# Patient Record
Sex: MALE | Race: BLACK OR AFRICAN AMERICAN | NOT HISPANIC OR LATINO | Employment: FULL TIME | URBAN - METROPOLITAN AREA
[De-identification: names, ages, dates, MRNs, and addresses within clinical notes are randomized per-mention and may not be internally consistent; named-entity substitution may affect disease eponyms.]

---

## 2017-03-29 ENCOUNTER — OFFICE VISIT (OUTPATIENT)
Dept: RETAIL CLINIC | Facility: CLINIC | Age: 37
End: 2017-03-29

## 2017-03-29 VITALS
WEIGHT: 220 LBS | TEMPERATURE: 99.3 F | BODY MASS INDEX: 29.8 KG/M2 | HEART RATE: 98 BPM | HEIGHT: 72 IN | OXYGEN SATURATION: 99 % | DIASTOLIC BLOOD PRESSURE: 90 MMHG | SYSTOLIC BLOOD PRESSURE: 132 MMHG

## 2017-03-29 DIAGNOSIS — J10.1 INFLUENZA B: Primary | ICD-10-CM

## 2017-03-29 DIAGNOSIS — R68.89 FLU-LIKE SYMPTOMS: ICD-10-CM

## 2017-03-29 LAB
EXPIRATION DATE: ABNORMAL
FLUAV AG NPH QL: NEGATIVE
FLUBV AG NPH QL: POSITIVE
INTERNAL CONTROL: ABNORMAL
Lab: ABNORMAL

## 2017-03-29 PROCEDURE — 99203 OFFICE O/P NEW LOW 30 MIN: CPT | Performed by: NURSE PRACTITIONER

## 2017-03-29 PROCEDURE — 87804 INFLUENZA ASSAY W/OPTIC: CPT | Performed by: NURSE PRACTITIONER

## 2017-03-29 RX ORDER — OSELTAMIVIR PHOSPHATE 75 MG/1
75 CAPSULE ORAL 2 TIMES DAILY
Qty: 10 CAPSULE | Refills: 0 | Status: SHIPPED | OUTPATIENT
Start: 2017-03-29 | End: 2017-04-03

## 2018-10-27 ENCOUNTER — OFFICE VISIT (OUTPATIENT)
Dept: RETAIL CLINIC | Facility: CLINIC | Age: 38
End: 2018-10-27

## 2018-10-27 VITALS
HEIGHT: 73 IN | DIASTOLIC BLOOD PRESSURE: 88 MMHG | SYSTOLIC BLOOD PRESSURE: 140 MMHG | WEIGHT: 212.8 LBS | OXYGEN SATURATION: 97 % | HEART RATE: 109 BPM | BODY MASS INDEX: 28.2 KG/M2 | TEMPERATURE: 98 F | RESPIRATION RATE: 18 BRPM

## 2018-10-27 DIAGNOSIS — Z00.00 WELL ADULT HEALTH CHECK: Primary | ICD-10-CM

## 2018-10-27 PROCEDURE — ADULTPHYSP: Performed by: NURSE PRACTITIONER

## 2018-10-27 NOTE — PROGRESS NOTES
"ITZ@  Berto Kumar is a 37 y.o. male.   Chief Complaint   Patient presents with   • URI     missed work earlier this week due to URI symptoms      Mr Kumar was ill with URI symptoms earlier this week.  Work is requiring him to be examined so he can return to work today.  Symptoms have resolved.        URI    This is a new problem. The current episode started in the past 7 days. The problem has been resolved. There has been no fever. Pertinent negatives include no abdominal pain, chest pain, congestion, coughing, diarrhea, dysuria, ear pain, headaches, nausea, plugged ear sensation, rash, rhinorrhea, sinus pain, sneezing, sore throat, swollen glands, vomiting or wheezing. He has tried nothing for the symptoms.        The following portions of the patient's history were reviewed and updated as appropriate: allergies, current medications, past family history, past medical history, past social history, past surgical history and problem list.  No current outpatient prescriptions on file.    No Known Allergies    Review of Systems   Constitutional: Negative for chills, fatigue and fever.   HENT: Negative for congestion, ear pain, rhinorrhea, sinus pain, sneezing, sore throat and tinnitus.    Eyes: Negative for redness and itching.   Respiratory: Negative for cough, shortness of breath and wheezing.    Cardiovascular: Negative for chest pain and palpitations.   Gastrointestinal: Negative for abdominal pain, diarrhea, nausea and vomiting.   Genitourinary: Negative for dysuria.   Skin: Negative for rash.   Neurological: Negative for dizziness and headaches.       Objective     Visit Vitals  /88 (BP Location: Right arm, Patient Position: Sitting, Cuff Size: Adult)   Pulse 109   Temp 98 °F (36.7 °C) (Oral)   Resp 18   Ht 185.4 cm (73\")   Wt 96.5 kg (212 lb 12.8 oz)   SpO2 97%   BMI 28.08 kg/m²         Physical Exam   Constitutional: He is oriented to person, place, and time. He appears well-developed and " well-nourished.   HENT:   Head: Normocephalic.   Right Ear: Tympanic membrane, external ear and ear canal normal.   Left Ear: Tympanic membrane, external ear and ear canal normal.   Nose: Nose normal.   Mouth/Throat: Uvula is midline, oropharynx is clear and moist and mucous membranes are normal.   Eyes: Pupils are equal, round, and reactive to light. Conjunctivae and EOM are normal.   Neck: Normal range of motion. Neck supple.   Cardiovascular: Normal rate, regular rhythm and normal heart sounds.    Pulmonary/Chest: Effort normal and breath sounds normal.   Abdominal: Soft. Bowel sounds are normal. He exhibits no distension. There is no tenderness. There is no guarding.   Lymphadenopathy:     He has no cervical adenopathy.   Neurological: He is alert and oriented to person, place, and time.   Skin: Skin is warm and dry. Capillary refill takes less than 2 seconds.       Lab Results (last 24 hours)     ** No results found for the last 24 hours. **          Assessment/Plan   Berto was seen today for uri.    Diagnoses and all orders for this visit:    Well adult health check        - Note given to patient to return to work immediately with no restrictions    An After Visit Summary was declined.

## 2020-11-02 PROCEDURE — U0003 INFECTIOUS AGENT DETECTION BY NUCLEIC ACID (DNA OR RNA); SEVERE ACUTE RESPIRATORY SYNDROME CORONAVIRUS 2 (SARS-COV-2) (CORONAVIRUS DISEASE [COVID-19]), AMPLIFIED PROBE TECHNIQUE, MAKING USE OF HIGH THROUGHPUT TECHNOLOGIES AS DESCRIBED BY CMS-2020-01-R: HCPCS | Performed by: FAMILY MEDICINE
